# Patient Record
Sex: FEMALE | Race: ASIAN | NOT HISPANIC OR LATINO | Employment: FULL TIME | URBAN - METROPOLITAN AREA
[De-identification: names, ages, dates, MRNs, and addresses within clinical notes are randomized per-mention and may not be internally consistent; named-entity substitution may affect disease eponyms.]

---

## 2021-07-19 ENCOUNTER — APPOINTMENT (OUTPATIENT)
Dept: CT IMAGING | Facility: CLINIC | Age: 36
End: 2021-07-19
Attending: PHYSICIAN ASSISTANT
Payer: COMMERCIAL

## 2021-07-19 ENCOUNTER — HOSPITAL ENCOUNTER (EMERGENCY)
Facility: CLINIC | Age: 36
Discharge: HOME | End: 2021-07-20
Attending: PHYSICIAN ASSISTANT | Admitting: PHYSICIAN ASSISTANT
Payer: COMMERCIAL

## 2021-07-19 VITALS
RESPIRATION RATE: 15 BRPM | WEIGHT: 127 LBS | OXYGEN SATURATION: 98 % | TEMPERATURE: 98.9 F | HEART RATE: 86 BPM | DIASTOLIC BLOOD PRESSURE: 85 MMHG | SYSTOLIC BLOOD PRESSURE: 124 MMHG

## 2021-07-19 DIAGNOSIS — L03.312 CELLULITIS OF BACK EXCEPT BUTTOCK: ICD-10-CM

## 2021-07-19 DIAGNOSIS — E87.6 HYPOKALEMIA: ICD-10-CM

## 2021-07-19 DIAGNOSIS — S32.010A COMPRESSION FRACTURE OF L1 VERTEBRA, INITIAL ENCOUNTER (H): ICD-10-CM

## 2021-07-19 DIAGNOSIS — R50.9 FEVER AND CHILLS: ICD-10-CM

## 2021-07-19 LAB
ANION GAP SERPL CALCULATED.3IONS-SCNC: 6 MMOL/L (ref 3–14)
BUN SERPL-MCNC: 15 MG/DL (ref 7–30)
CALCIUM SERPL-MCNC: 8.7 MG/DL (ref 8.5–10.1)
CHLORIDE BLD-SCNC: 106 MMOL/L (ref 94–109)
CO2 SERPL-SCNC: 27 MMOL/L (ref 20–32)
CREAT SERPL-MCNC: 0.7 MG/DL (ref 0.52–1.04)
ERYTHROCYTE [DISTWIDTH] IN BLOOD BY AUTOMATED COUNT: 12.5 % (ref 10–15)
GFR SERPL CREATININE-BSD FRML MDRD: >90 ML/MIN/1.73M2
GLUCOSE BLD-MCNC: 90 MG/DL (ref 70–99)
HCT VFR BLD AUTO: 38.8 % (ref 35–47)
HGB BLD-MCNC: 12.3 G/DL (ref 11.7–15.7)
LACTATE SERPL-SCNC: 0.6 MMOL/L (ref 0.7–2)
MCH RBC QN AUTO: 30 PG (ref 26.5–33)
MCHC RBC AUTO-ENTMCNC: 31.7 G/DL (ref 31.5–36.5)
MCV RBC AUTO: 95 FL (ref 78–100)
PLATELET # BLD AUTO: 243 10E3/UL (ref 150–450)
POTASSIUM BLD-SCNC: 3.1 MMOL/L (ref 3.4–5.3)
RBC # BLD AUTO: 4.1 10E6/UL (ref 3.8–5.2)
SODIUM SERPL-SCNC: 139 MMOL/L (ref 133–144)
WBC # BLD AUTO: 17.8 10E3/UL (ref 4–11)

## 2021-07-19 PROCEDURE — 83605 ASSAY OF LACTIC ACID: CPT | Performed by: NURSE PRACTITIONER

## 2021-07-19 PROCEDURE — 81001 URINALYSIS AUTO W/SCOPE: CPT | Performed by: PHYSICIAN ASSISTANT

## 2021-07-19 PROCEDURE — 80048 BASIC METABOLIC PNL TOTAL CA: CPT | Performed by: NURSE PRACTITIONER

## 2021-07-19 PROCEDURE — 74177 CT ABD & PELVIS W/CONTRAST: CPT

## 2021-07-19 PROCEDURE — 99285 EMERGENCY DEPT VISIT HI MDM: CPT | Mod: 25

## 2021-07-19 PROCEDURE — 36592 COLLECT BLOOD FROM PICC: CPT | Performed by: NURSE PRACTITIONER

## 2021-07-19 PROCEDURE — 96361 HYDRATE IV INFUSION ADD-ON: CPT

## 2021-07-19 PROCEDURE — 250N000011 HC RX IP 250 OP 636: Performed by: PHYSICIAN ASSISTANT

## 2021-07-19 PROCEDURE — 96375 TX/PRO/DX INJ NEW DRUG ADDON: CPT

## 2021-07-19 PROCEDURE — 96374 THER/PROPH/DIAG INJ IV PUSH: CPT | Mod: 59

## 2021-07-19 PROCEDURE — 85027 COMPLETE CBC AUTOMATED: CPT | Performed by: NURSE PRACTITIONER

## 2021-07-19 PROCEDURE — 36415 COLL VENOUS BLD VENIPUNCTURE: CPT | Performed by: NURSE PRACTITIONER

## 2021-07-19 PROCEDURE — 258N000003 HC RX IP 258 OP 636: Performed by: PHYSICIAN ASSISTANT

## 2021-07-19 RX ORDER — ONDANSETRON 2 MG/ML
4 INJECTION INTRAMUSCULAR; INTRAVENOUS ONCE
Status: COMPLETED | OUTPATIENT
Start: 2021-07-19 | End: 2021-07-19

## 2021-07-19 RX ORDER — IOPAMIDOL 755 MG/ML
500 INJECTION, SOLUTION INTRAVASCULAR ONCE
Status: COMPLETED | OUTPATIENT
Start: 2021-07-19 | End: 2021-07-19

## 2021-07-19 RX ORDER — CEFTRIAXONE 1 G/1
1 INJECTION, POWDER, FOR SOLUTION INTRAMUSCULAR; INTRAVENOUS ONCE
Status: COMPLETED | OUTPATIENT
Start: 2021-07-19 | End: 2021-07-19

## 2021-07-19 RX ORDER — POTASSIUM CHLORIDE 1500 MG/1
40 TABLET, EXTENDED RELEASE ORAL ONCE
Status: COMPLETED | OUTPATIENT
Start: 2021-07-19 | End: 2021-07-20

## 2021-07-19 RX ADMIN — SODIUM CHLORIDE 56 ML: 9 INJECTION, SOLUTION INTRAVENOUS at 23:28

## 2021-07-19 RX ADMIN — CEFTRIAXONE 1 G: 1 INJECTION, POWDER, FOR SOLUTION INTRAMUSCULAR; INTRAVENOUS at 23:02

## 2021-07-19 RX ADMIN — ONDANSETRON 4 MG: 2 INJECTION INTRAMUSCULAR; INTRAVENOUS at 23:02

## 2021-07-19 RX ADMIN — IOPAMIDOL 64 ML: 755 INJECTION, SOLUTION INTRAVENOUS at 23:28

## 2021-07-19 NOTE — LETTER
July 20, 2021      To Whom It May Concern:      Francisco Parham was seen in our Emergency Department today, 07/20/21.  I expect her condition to improve over the next 1-2 days.  She may return to work/school when improved.    Sincerely,    Jaja GRIMALDO

## 2021-07-20 LAB
ALBUMIN UR-MCNC: 20 MG/DL
APPEARANCE UR: CLEAR
BILIRUB UR QL STRIP: NEGATIVE
COLOR UR AUTO: ABNORMAL
GLUCOSE UR STRIP-MCNC: NEGATIVE MG/DL
HGB UR QL STRIP: NEGATIVE
HOLD SPECIMEN: NORMAL
KETONES UR STRIP-MCNC: 20 MG/DL
LEUKOCYTE ESTERASE UR QL STRIP: NEGATIVE
MUCOUS THREADS #/AREA URNS LPF: PRESENT /LPF
NITRATE UR QL: NEGATIVE
PH UR STRIP: 6 [PH] (ref 5–7)
RBC URINE: 1 /HPF
SP GR UR STRIP: 1.03 (ref 1–1.03)
SQUAMOUS EPITHELIAL: 1 /HPF
UROBILINOGEN UR STRIP-MCNC: 2 MG/DL
WBC URINE: 1 /HPF

## 2021-07-20 PROCEDURE — 250N000013 HC RX MED GY IP 250 OP 250 PS 637: Performed by: PHYSICIAN ASSISTANT

## 2021-07-20 RX ORDER — DOXYCYCLINE 100 MG/1
100 CAPSULE ORAL 2 TIMES DAILY
Qty: 20 CAPSULE | Refills: 0 | Status: SHIPPED | OUTPATIENT
Start: 2021-07-20 | End: 2021-07-30

## 2021-07-20 RX ORDER — AMOXICILLIN 500 MG/1
500 CAPSULE ORAL 3 TIMES DAILY
Qty: 30 CAPSULE | Refills: 0 | Status: SHIPPED | OUTPATIENT
Start: 2021-07-20 | End: 2021-07-30

## 2021-07-20 RX ADMIN — POTASSIUM CHLORIDE 40 MEQ: 1500 TABLET, EXTENDED RELEASE ORAL at 00:16

## 2021-07-20 NOTE — ED PROVIDER NOTES
History   Chief Complaint:  Wound Check       HPI   Francisco Parham is a 36 year old female with history of bipolar disorder, migraines, and anxiety who presents for a wound check. Per chart review, the patient was seen at urgent care earlier today for suspected bug bite on her lower back from a week ago. Today, she says that the area is now more red and last night she developed chills, nausea, and shortness of breath. Urgent Care results below. Here in the ED, the patient says that the fever, chills and body aches developed 2 days ago. She says that the bug bite was 4-5 days ago on her left lower back and since then has remained painful. She says that the redness has spread since then. The patient endorses some diarrhea 3 days ago and some current abdominal pain. She endorses the use of Tylenol, with the latest dose being at 2000 today. The patient says that she is fully vaccinated against COVID and is pending COVID test results from urgent care.       Laboratory work-up from Urgent Care 7/19/21  CRP: 2.7 (H)  HCG Urine: negative   Lyme Disease Screen: 0.27, negative   SED rate: 21 (H)  Group A strep: negative   CBC: WBC 15.8 (H), HGB 12.3,     COVID test: pending     Review of Systems  Ten review of systems negative, apart from what is noted above in HPI.     Allergies:  Sumatriptan     Medications:  Patient denies any medication use.     Past Medical History:    Vertigo  EBEN III  Bipolar disorder    PTSD    Alcoholism   Migraines  Anxiety   Arthritis     Past Surgical History:    Breast implants     Family History:    Mental disorder  High cholesterol  Stroke  Heart attack  Migraines     Social History:  Patient presents to the ED alone.     Physical Exam     Patient Vitals for the past 24 hrs:   BP Temp Temp src Pulse Resp SpO2 Weight   07/19/21 2307 124/85 -- -- 86 15 98 % --   07/19/21 2245 -- -- -- -- -- -- 57.6 kg (127 lb)   07/19/21 2102 120/86 98.9  F (37.2  C) Oral 89 16 100 % --       Physical  Exam  General: Alert and interactive. Appears well. Cooperative and pleasant.   Eyes: The pupils are equal and round. EOMs intact. No scleral icterus.  ENT: No abnormalities to the external nose or ears. Mucous membranes moist. Posterior oropharynx is mildly erythematous. Neck: Trachea is in the midline. No nuchal rigidity.     CV: Regular rate and rhythm. S1 and S2 normal without murmur, click, gallop or rub.   Resp: Breath sounds are clear bilaterally, without rhonchi, wheezes, rales. Non-labored, no retractions or accessory muscle use.     GI: Abdomen is soft without distension. Tenderness to the lower abdomen without guarding. There is a large indurated area of swelling to the left flank area with a puncture wound along the medial aspect. There is about 10 cm of swelling with blanchable erythema and warmth but no fluctuance or signs of abscess.   MS: Moving all extremities well. Good muscle tone.   Neuro: Alert and oriented x 3. No focal neurologic deficits. Good strength and sensation in upper and lower extremities. Psych: Awake. Alert.  Normal affect. Appropriate interactions.  Lymph: No anterior or posterior cervical lymphadenopathy noted.    Emergency Department Course     Imaging:  CT Abdomen Pelvis w Contrast   Final Result   IMPRESSION:    1.  Skin thickening and soft tissue edema left flank consistent with the history of cellulitis. No abscess or intra-abdominal extension.   2.  Small amount of pelvic free fluid.   3.  Moderate compression deformity L1.          Laboratory:       Labs Ordered and Resulted from Time of ED Arrival Up to the Time of Departure from the ED   CBC WITH PLATELETS - Abnormal; Notable for the following components:       Result Value    WBC Count 17.8 (*)     All other components within normal limits   LACTIC ACID WHOLE BLOOD - Abnormal; Notable for the following components:    Lactic Acid 0.6 (*)     All other components within normal limits   BASIC METABOLIC PANEL - Abnormal;  Notable for the following components:    Potassium 3.1 (*)     All other components within normal limits   ROUTINE UA WITH MICROSCOPIC REFLEX TO CULTURE - Abnormal; Notable for the following components:    Color Urine Orange (*)     Ketones Urine 20  (*)     Protein Albumin Urine 20  (*)     Mucus Urine Present (*)     All other components within normal limits    Narrative:     Urine Culture not indicated   EXTRA RED TOP TUBE   EXTRA TUBE    Narrative:     The following orders were created for panel order Extra Tube ([facility] Draw).  Procedure                               Abnormality         Status                     ---------                               -----------         ------                     Extra Red Top Tube[[id number]]                               Final result                 Please view results for these tests on the individual orders.         Emergency Department Course:    Reviewed:  I reviewed nursing notes, vitals, past medical history and care everywhere     Assessments:  2233 I performed an exam of the patient as documented above.     Interventions:  Medications   cefTRIAXone (ROCEPHIN) 1 g vial to attach to  mL bag for ADULTS or NS 50 mL bag for PEDS (0 g Intravenous Stopped 7/19/21 2317)   ondansetron (ZOFRAN) injection 4 mg (4 mg Intravenous Given 7/19/21 2302)   0.9% sodium chloride BOLUS (0 mLs Intravenous Stopped 7/20/21 0017)   iopamidol (ISOVUE-370) solution 500 mL (64 mLs Intravenous Given 7/19/21 2328)   potassium chloride ER (KLOR-CON M) CR tablet 40 mEq (40 mEq Oral Given 7/20/21 0016)        Disposition:  The patient was discharged to home.       Impression & Plan     Medical Decision Making:  Francisco Parham is a 36 year old female who presents to the emergency department today for evaluation of fevers, chills, and abdominal pain with a rash/erythema to the left flank. The patient thinks that she got bit by a bug approximately 5 days ago and has had induration and swelling to  the left flank ever since then. She had laboratory work-up completed at an urgent care earlier that showed an elevated CRP, sed rate, white cell count. Her COVID swab is pending, but strep was negative. No significant cough, shortness of breath, or other upper respiratory symptoms that would suggest pneumonia. Given her abdominal discomfort and obvious cellulitis, did obtain CT scan to rule out abscess or other complicating intra-abdominal pathologies, but fortunately this is negative for abscess or intraabdominal extension. She does have subcutaneous swelling, consistent with cellulitis. She was given Rocephin here to initiate treatment in the area was marked. Her lactic is normal; no sepsis or other systemic illness. I will start her on doxycycline and amoxicillin to cover for MRSA, streptococcal skin infections, and Lyme disease. Her Lyme titer at the clinic earlier was negative, and this does not appear to be consistent with erythema migrans. I have suggested that she watch this area closely, following up with primary care in a few days for wound recheck. She should return here for fevers, rapidly spreading redness, persistent vomiting, or other worrisome concerns.    Diagnosis:    ICD-10-CM    1. Cellulitis of back except buttock  L03.312    2. Fever and chills  R50.9    3. Hypokalemia  E87.6    4. Compression fracture of L1 vertebra, initial encounter (H)  S32.010A        Discharge Medications:  Discharge Medication List as of 7/20/2021 12:17 AM      START taking these medications    Details   amoxicillin (AMOXIL) 500 MG capsule Take 1 capsule (500 mg) by mouth 3 times daily for 10 days, Disp-30 capsule, R-0, Local Print      doxycycline hyclate (VIBRAMYCIN) 100 MG capsule Take 1 capsule (100 mg) by mouth 2 times daily for 10 days, Disp-20 capsule, R-0, Local Print             Scribe Disclosure:  I, Chester Macedo, am serving as a scribe at 10:22 PM on 7/19/2021 to document services personally performed by  Lucy Way PA-C based on my observations and the provider's statements to me.          Lucy Way PA-C  07/20/21 0040

## 2021-07-20 NOTE — DISCHARGE INSTRUCTIONS
Begin Taking Amoxicillin and Doxycyline for strep and staph coverage (these cover Lyme disease as well).   Use heat to your flank area.   Watch the outlined area to make sure it is improving and not rapidly spreading.     Discharge Instructions  Cellulitis    Cellulitis is an infection of the skin that occurs when bacteria enter the skin.   Symptoms are generally redness, swelling, warmth and pain.  Your infection appeared to be appropriate to treat at home with antibiotics.  However, sometimes your infection may be worse than it seemed at first, or may worsen with time. If you have new or worse symptoms, you may need to be seen again in the Emergency Department or by your primary provider.    Generally, every Emergency Department visit should have a follow-up clinic visit with either a primary or a specialty clinic/provider. Please follow-up as instructed by your emergency provider today.    Return to the Emergency Department if:  The redness, pain, or swelling gets a lot worse.  If the red area was marked, return if it is red significantly beyond the marked area.  You are unable to get your antibiotics, or are vomiting (throwing up) these pills, or you cannot take them.  You are feeling more ill, weak or lightheaded.  You start to run a new fever (temperature >101 F).  Anything else about the infection worries or concerns you.  Treatment:  Start your antibiotics right away, and take them as prescribed. Be sure to finish the whole prescription, even if you are better.  Apply a heating pad, warm packs, or warm water soaks to the infected area for 15 minutes at a time, at least 3 times a day. Do not use a heating pad on your feet or legs if you have diabetes. Do not sleep with a heating pad on, since this can cause burns or skin injury.  Rest your injured area for at least 1-2 days. After that you may start using your extremity again as long as there is not too much pain.   Raise the injured area above the level of  your heart as much as possible in the first 1-2 days.  Tylenol  (acetaminophen), Motrin  (ibuprofen), or Advil  (ibuprofen) may help may help reduce pain and fever and may help you feel more comfortable. Be sure to read and follow the package directions, and ask your provider if you have questions.    If you were given a prescription for medicine here today, be sure to read all of the information (including the package insert) that comes with your prescription.  This will include important information about the medicine, its side effects, and any warnings that you need to know about.  The pharmacist who fills the prescription can provide more information and answer questions you may have about the medicine.  If you have questions or concerns that the pharmacist cannot address, please call or return to the Emergency Department.     Remember that you can always come back to the Emergency Department if you are not able to see your regular provider in the amount of time listed above, if you get any new symptoms, or if there is anything that worries you.

## 2021-07-20 NOTE — ED TRIAGE NOTES
Pt aox4, ABCs intact. Pt c/o fever/chills/body aches and increasing redness around bug bite. Pt was bit on Sunday by unknown insect. Redness around the bite is 5 inches in diameter. Tylenol last at 2000.

## 2021-10-17 ENCOUNTER — HEALTH MAINTENANCE LETTER (OUTPATIENT)
Age: 36
End: 2021-10-17

## 2022-10-03 ENCOUNTER — HEALTH MAINTENANCE LETTER (OUTPATIENT)
Age: 37
End: 2022-10-03

## 2023-02-11 ENCOUNTER — HEALTH MAINTENANCE LETTER (OUTPATIENT)
Age: 38
End: 2023-02-11

## 2024-03-09 ENCOUNTER — HEALTH MAINTENANCE LETTER (OUTPATIENT)
Age: 39
End: 2024-03-09

## 2024-03-16 ENCOUNTER — HOSPITAL ENCOUNTER (EMERGENCY)
Facility: CLINIC | Age: 39
Discharge: HOME | End: 2024-03-16
Attending: EMERGENCY MEDICINE | Admitting: EMERGENCY MEDICINE
Payer: COMMERCIAL

## 2024-03-16 ENCOUNTER — APPOINTMENT (OUTPATIENT)
Dept: CT IMAGING | Facility: CLINIC | Age: 39
End: 2024-03-16
Attending: EMERGENCY MEDICINE
Payer: COMMERCIAL

## 2024-03-16 VITALS
HEART RATE: 81 BPM | DIASTOLIC BLOOD PRESSURE: 110 MMHG | TEMPERATURE: 99.1 F | OXYGEN SATURATION: 100 % | SYSTOLIC BLOOD PRESSURE: 163 MMHG | RESPIRATION RATE: 16 BRPM

## 2024-03-16 DIAGNOSIS — S16.1XXA CERVICAL STRAIN, INITIAL ENCOUNTER: ICD-10-CM

## 2024-03-16 DIAGNOSIS — S06.0X0A CONCUSSION WITHOUT LOSS OF CONSCIOUSNESS, INITIAL ENCOUNTER: ICD-10-CM
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
## 2024-03-16 DIAGNOSIS — R03.0 ELEVATED BLOOD PRESSURE READING: ICD-10-CM

## 2024-03-16 LAB — HCG UR QL: NEGATIVE

## 2024-03-16 PROCEDURE — 81025 URINE PREGNANCY TEST: CPT | Performed by: EMERGENCY MEDICINE

## 2024-03-16 PROCEDURE — 99284 EMERGENCY DEPT VISIT MOD MDM: CPT | Mod: 25

## 2024-03-16 PROCEDURE — 72125 CT NECK SPINE W/O DYE: CPT

## 2024-03-16 RX ORDER — AMLODIPINE BESYLATE 10 MG/1
5 TABLET ORAL DAILY
Qty: 30 TABLET | Refills: 0 | Status: SHIPPED | OUTPATIENT
Start: 2024-03-16

## 2024-03-16 ASSESSMENT — ACTIVITIES OF DAILY LIVING (ADL)
ADLS_ACUITY_SCORE: 35
ADLS_ACUITY_SCORE: 33
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
## 2024-03-17 NOTE — DISCHARGE INSTRUCTIONS
Please resume taking your blood pressure medication, and keep a log to discuss with your primary care doctor.

## 2024-03-18 ENCOUNTER — HOSPITAL ENCOUNTER (EMERGENCY)
Facility: CLINIC | Age: 39
Discharge: HOME | End: 2024-03-18
Attending: EMERGENCY MEDICINE | Admitting: EMERGENCY MEDICINE
Payer: COMMERCIAL

## 2024-03-18 VITALS
HEIGHT: 64 IN | TEMPERATURE: 98.4 F | HEART RATE: 87 BPM | OXYGEN SATURATION: 98 % | RESPIRATION RATE: 18 BRPM | DIASTOLIC BLOOD PRESSURE: 83 MMHG | WEIGHT: 120 LBS | SYSTOLIC BLOOD PRESSURE: 128 MMHG | BODY MASS INDEX: 20.49 KG/M2

## 2024-03-18 DIAGNOSIS — R07.89 CHEST WALL PAIN: ICD-10-CM

## 2024-03-18 DIAGNOSIS — F41.9 ANXIETY: ICD-10-CM

## 2024-03-18 LAB
ALBUMIN SERPL BCG-MCNC: 4.3 G/DL (ref 3.5–5.2)
ALP SERPL-CCNC: 44 U/L (ref 40–150)
ALT SERPL W P-5'-P-CCNC: 16 U/L (ref 0–50)
ANION GAP SERPL CALCULATED.3IONS-SCNC: 13 MMOL/L (ref 7–15)
AST SERPL W P-5'-P-CCNC: 21 U/L (ref 0–45)
ATRIAL RATE - MUSE: 86 BPM
BASOPHILS # BLD AUTO: 0 10E3/UL (ref 0–0.2)
BASOPHILS NFR BLD AUTO: 0 %
BILIRUB SERPL-MCNC: 0.6 MG/DL
BUN SERPL-MCNC: 9.1 MG/DL (ref 6–20)
CALCIUM SERPL-MCNC: 8.8 MG/DL (ref 8.6–10)
CHLORIDE SERPL-SCNC: 105 MMOL/L (ref 98–107)
CREAT SERPL-MCNC: 0.58 MG/DL (ref 0.51–0.95)
DEPRECATED HCO3 PLAS-SCNC: 23 MMOL/L (ref 22–29)
DIASTOLIC BLOOD PRESSURE - MUSE: NORMAL MMHG
EGFRCR SERPLBLD CKD-EPI 2021: >90 ML/MIN/1.73M2
EOSINOPHIL # BLD AUTO: 0 10E3/UL (ref 0–0.7)
EOSINOPHIL NFR BLD AUTO: 0 %
ERYTHROCYTE [DISTWIDTH] IN BLOOD BY AUTOMATED COUNT: 12.2 % (ref 10–15)
GLUCOSE SERPL-MCNC: 183 MG/DL (ref 70–99)
HCT VFR BLD AUTO: 38.4 % (ref 35–47)
HGB BLD-MCNC: 12.5 G/DL (ref 11.7–15.7)
IMM GRANULOCYTES # BLD: 0 10E3/UL
IMM GRANULOCYTES NFR BLD: 0 %
INTERPRETATION ECG - MUSE: NORMAL
LYMPHOCYTES # BLD AUTO: 2.1 10E3/UL (ref 0.8–5.3)
LYMPHOCYTES NFR BLD AUTO: 26 %
MCH RBC QN AUTO: 30.3 PG (ref 26.5–33)
MCHC RBC AUTO-ENTMCNC: 32.6 G/DL (ref 31.5–36.5)
MCV RBC AUTO: 93 FL (ref 78–100)
MONOCYTES # BLD AUTO: 0.4 10E3/UL (ref 0–1.3)
MONOCYTES NFR BLD AUTO: 5 %
NEUTROPHILS # BLD AUTO: 5.4 10E3/UL (ref 1.6–8.3)
NEUTROPHILS NFR BLD AUTO: 69 %
NRBC # BLD AUTO: 0 10E3/UL
NRBC BLD AUTO-RTO: 0 /100
P AXIS - MUSE: 67 DEGREES
PLATELET # BLD AUTO: 272 10E3/UL (ref 150–450)
POTASSIUM SERPL-SCNC: 3.3 MMOL/L (ref 3.4–5.3)
PR INTERVAL - MUSE: 134 MS
PROT SERPL-MCNC: 7.3 G/DL (ref 6.4–8.3)
QRS DURATION - MUSE: 88 MS
QT - MUSE: 378 MS
QTC - MUSE: 452 MS
R AXIS - MUSE: -43 DEGREES
RBC # BLD AUTO: 4.13 10E6/UL (ref 3.8–5.2)
SODIUM SERPL-SCNC: 141 MMOL/L (ref 135–145)
SYSTOLIC BLOOD PRESSURE - MUSE: NORMAL MMHG
T AXIS - MUSE: 60 DEGREES
TROPONIN T SERPL HS-MCNC: <6 NG/L
VENTRICULAR RATE- MUSE: 86 BPM
WBC # BLD AUTO: 8 10E3/UL (ref 4–11)

## 2024-03-18 PROCEDURE — 84484 ASSAY OF TROPONIN QUANT: CPT | Performed by: EMERGENCY MEDICINE

## 2024-03-18 PROCEDURE — 250N000013 HC RX MED GY IP 250 OP 250 PS 637: Performed by: EMERGENCY MEDICINE

## 2024-03-18 PROCEDURE — 80053 COMPREHEN METABOLIC PANEL: CPT | Performed by: EMERGENCY MEDICINE

## 2024-03-18 PROCEDURE — 93005 ELECTROCARDIOGRAM TRACING: CPT

## 2024-03-18 PROCEDURE — 85025 COMPLETE CBC W/AUTO DIFF WBC: CPT | Performed by: EMERGENCY MEDICINE

## 2024-03-18 PROCEDURE — 36415 COLL VENOUS BLD VENIPUNCTURE: CPT | Performed by: EMERGENCY MEDICINE

## 2024-03-18 PROCEDURE — 99284 EMERGENCY DEPT VISIT MOD MDM: CPT

## 2024-03-18 RX ORDER — IBUPROFEN 400 MG/1
400 TABLET, FILM COATED ORAL ONCE
Status: COMPLETED | OUTPATIENT
Start: 2024-03-18 | End: 2024-03-18

## 2024-03-18 RX ORDER — DIAZEPAM 5 MG
5 TABLET ORAL ONCE
Status: COMPLETED | OUTPATIENT
Start: 2024-03-18 | End: 2024-03-18

## 2024-03-18 RX ORDER — ESCITALOPRAM OXALATE 10 MG/1
TABLET ORAL
COMMUNITY
Start: 2023-05-29

## 2024-03-18 RX ORDER — DIAZEPAM 5 MG
5 TABLET ORAL EVERY 6 HOURS PRN
Qty: 20 TABLET | Refills: 0 | Status: SHIPPED | OUTPATIENT
Start: 2024-03-18 | End: 2024-03-25

## 2024-03-18 RX ORDER — ESCITALOPRAM OXALATE 5 MG/1
5 TABLET ORAL DAILY
COMMUNITY
Start: 2023-09-29 | End: 2024-09-10

## 2024-03-18 RX ADMIN — IBUPROFEN 400 MG: 400 TABLET ORAL at 19:52

## 2024-03-18 RX ADMIN — DIAZEPAM 5 MG: 5 TABLET ORAL at 19:52

## 2024-03-18 ASSESSMENT — COLUMBIA-SUICIDE SEVERITY RATING SCALE - C-SSRS
2. HAVE YOU ACTUALLY HAD ANY THOUGHTS OF KILLING YOURSELF IN THE PAST MONTH?: NO
6. HAVE YOU EVER DONE ANYTHING, STARTED TO DO ANYTHING, OR PREPARED TO DO ANYTHING TO END YOUR LIFE?: NO
1. IN THE PAST MONTH, HAVE YOU WISHED YOU WERE DEAD OR WISHED YOU COULD GO TO SLEEP AND NOT WAKE UP?: NO

## 2024-03-18 ASSESSMENT — ACTIVITIES OF DAILY LIVING (ADL)
ADLS_ACUITY_SCORE: 35
ADLS_ACUITY_SCORE: 35
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
## 2024-04-09 ENCOUNTER — OFFICE VISIT (OUTPATIENT)
Dept: PHYSICAL MEDICINE AND REHAB | Facility: CLINIC | Age: 39
End: 2024-04-09
Attending: EMERGENCY MEDICINE
Payer: OTHER MISCELLANEOUS

## 2024-04-09 VITALS — HEART RATE: 79 BPM | DIASTOLIC BLOOD PRESSURE: 87 MMHG | SYSTOLIC BLOOD PRESSURE: 134 MMHG

## 2024-04-09 DIAGNOSIS — M54.2 NECK PAIN: ICD-10-CM
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
## 2024-04-09 DIAGNOSIS — S06.0X0A CONCUSSION WITHOUT LOSS OF CONSCIOUSNESS, INITIAL ENCOUNTER: Primary | ICD-10-CM

## 2024-04-09 PROCEDURE — 99205 OFFICE O/P NEW HI 60 MIN: CPT | Performed by: PHYSICAL MEDICINE & REHABILITATION

## 2024-04-09 RX ORDER — DIAZEPAM 5 MG
5 TABLET ORAL EVERY 6 HOURS PRN
COMMUNITY

## 2024-04-09 RX ORDER — HYDROXYZINE HYDROCHLORIDE 10 MG/1
10 TABLET, FILM COATED ORAL 3 TIMES DAILY PRN
COMMUNITY
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
## 2024-04-18 ENCOUNTER — THERAPY VISIT (OUTPATIENT)
Dept: OCCUPATIONAL THERAPY | Facility: CLINIC | Age: 39
End: 2024-04-18
Attending: PHYSICAL MEDICINE & REHABILITATION
Payer: COMMERCIAL

## 2024-04-18 DIAGNOSIS — S06.0X0A CONCUSSION WITHOUT LOSS OF CONSCIOUSNESS, INITIAL ENCOUNTER: ICD-10-CM

## 2024-04-18 PROCEDURE — 97535 SELF CARE MNGMENT TRAINING: CPT | Mod: GO | Performed by: OCCUPATIONAL THERAPIST

## 2024-04-18 PROCEDURE — 97165 OT EVAL LOW COMPLEX 30 MIN: CPT | Mod: GO | Performed by: OCCUPATIONAL THERAPIST

## 2024-04-18 PROCEDURE — 97530 THERAPEUTIC ACTIVITIES: CPT | Mod: GO | Performed by: OCCUPATIONAL THERAPIST
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
## 2024-04-25 ENCOUNTER — ANCILLARY PROCEDURE (OUTPATIENT)
Dept: CT IMAGING | Facility: CLINIC | Age: 39
End: 2024-04-25
Attending: PHYSICAL MEDICINE & REHABILITATION
Payer: OTHER MISCELLANEOUS

## 2024-04-25 DIAGNOSIS — S06.0X0A CONCUSSION WITHOUT LOSS OF CONSCIOUSNESS, INITIAL ENCOUNTER: ICD-10-CM

## 2024-04-25 PROCEDURE — 70450 CT HEAD/BRAIN W/O DYE: CPT | Performed by: RADIOLOGY

## 2024-05-14 ENCOUNTER — THERAPY VISIT (OUTPATIENT)
Dept: OCCUPATIONAL THERAPY | Facility: CLINIC | Age: 39
End: 2024-05-14
Attending: PHYSICAL MEDICINE & REHABILITATION
Payer: OTHER MISCELLANEOUS

## 2024-05-14 DIAGNOSIS — S06.0X0A CONCUSSION WITHOUT LOSS OF CONSCIOUSNESS, INITIAL ENCOUNTER: Primary | ICD-10-CM

## 2024-05-14 PROCEDURE — 97535 SELF CARE MNGMENT TRAINING: CPT | Mod: GO | Performed by: OCCUPATIONAL THERAPIST

## 2024-05-14 PROCEDURE — 97530 THERAPEUTIC ACTIVITIES: CPT | Mod: GO | Performed by: OCCUPATIONAL THERAPIST

## 2024-05-20 ENCOUNTER — TELEPHONE (OUTPATIENT)
Dept: BEHAVIORAL HEALTH | Facility: CLINIC | Age: 39
End: 2024-05-20
Payer: COMMERCIAL

## 2024-05-20 ENCOUNTER — OFFICE VISIT (OUTPATIENT)
Dept: NEUROLOGY | Facility: CLINIC | Age: 39
End: 2024-05-20
Payer: OTHER MISCELLANEOUS

## 2024-05-20 DIAGNOSIS — S06.0X0A CONCUSSION WITHOUT LOSS OF CONSCIOUSNESS, INITIAL ENCOUNTER: ICD-10-CM

## 2024-05-20 DIAGNOSIS — G47.00 INSOMNIA, UNSPECIFIED TYPE: ICD-10-CM

## 2024-05-20 DIAGNOSIS — F43.23 ADJUSTMENT DISORDER WITH MIXED ANXIETY AND DEPRESSED MOOD: Primary | ICD-10-CM

## 2024-05-20 PROCEDURE — 96132 NRPSYC TST EVAL PHYS/QHP 1ST: CPT | Performed by: CLINICAL NEUROPSYCHOLOGIST

## 2024-05-20 PROCEDURE — 96138 PSYCL/NRPSYC TECH 1ST: CPT | Performed by: CLINICAL NEUROPSYCHOLOGIST

## 2024-05-20 PROCEDURE — 96133 NRPSYC TST EVAL PHYS/QHP EA: CPT | Performed by: CLINICAL NEUROPSYCHOLOGIST

## 2024-05-20 PROCEDURE — 96116 NUBHVL XM PHYS/QHP 1ST HR: CPT | Performed by: CLINICAL NEUROPSYCHOLOGIST
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
## 2024-05-20 NOTE — TELEPHONE ENCOUNTER
First attempt at reaching patient. Left message asking for a return call to schedule with the TC. MyChart message sent. Will postpone for follow up tomorrow.    Nila Díaz  Transition Clinic Coordinator  05/20/24 5:27 PM

## 2024-05-20 NOTE — PROGRESS NOTES
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 evaluation of mild neurocognitive disorder.
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
## 2024-05-20 NOTE — PROGRESS NOTES
The patient was seen for a neuropsychological evaluation for the purposes of diagnostic clarification and treatment planning. 35 minutes of face-to-face testing were provided by this writer. An additional 5 minutes were spent scoring and compiling test results. The patient was cooperative with testing. No concerns were brought to my attention. Please see Dr. Robles's report for a detailed description of the charges and interpretation and integration of the findings.

## 2024-05-21 ENCOUNTER — TELEPHONE (OUTPATIENT)
Dept: BEHAVIORAL HEALTH | Facility: CLINIC | Age: 39
End: 2024-05-21
Payer: COMMERCIAL
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
## 2024-05-21 NOTE — TELEPHONE ENCOUNTER
Reached patient. Coordinator explained TC services and reviewed next LOC. Scheduled initial TC Therapy 6/4/2024. Patient reports appt is related to a Work Comp Claim.    Referral will be closed, reply sent to referral source and tracker completed.

## 2024-05-22 NOTE — PATIENT INSTRUCTIONS
Cognitive Strategies  Take notes! Keep a small notepad with you in your purse/pocket/bag and write things down that you want to remember. You might also keep a notepad in a convenient location in your home (e.g., next to your telephone or calendar). Taking your notes in a note pad (instead of on multiple post-it notes) might help you stay organized, and you will also remember where to go to look for the notes that you took.  Create checklists, grocery lists, and to-do lists. Cross things off as you finish them.  Use a calendar or planner and get in the habit of checking it daily. Make it a part of your morning and/or nighttime routine to remind yourself of upcoming events, activities, or appointments. Cross the days off as they pass so that you can quickly glance at the calendar to know what day it is and what you have going on.  Set alarm reminders. For example, you can set an alarm to remind you to take your medications, turn off the oven, turn off the sprinkler outside, or to start getting ready for your appointment. If you use a smart phone, often times you can label the alarm that goes off so that you know what the alarm is for when it chimes.  Use a pillbox to follow your medication regimen. A pillbox acts as a nice visual cue to remind us to take our medications. If you have trouble remembering whether or not you have already taken your medications today, a pillbox can be extremely helpful to help with this issue.  Use a GPS when driving to help you stay on route.  When having an important conversation or completing an important task, reduce as many distractions in the environment as you can. For example, turn off the TV or the music in the background. Turn off or silence your cell phone. Clear your work area of everything that you do not need for the task at hand.  Establish set locations for certain items. For example, if you keep your keys on a hook by your door, you will always know where to go to look  for them.   Maintain a daily routine, especially for morning and nighttime tasks.    Sleep Hygiene    What is Sleep Hygiene?  'Sleep hygiene' is the term used to describe good sleep habits. Considerable research has gone into developing a set of guidelines and tips which are designed to enhance good sleeping, and there is much evidence to suggest that these strategies can provide long-term solutions to sleep difficulties.     Sleep Hygiene Tips:  1) Get regular. One of the best ways to train your body to sleep well is to go to bed and get up at more or less the same time every day, even on weekends and days off! This regular rhythm will make you feel better and will give your body something to work from.    2) Sleep when sleepy. Only try to sleep when you actually feel tired or sleepy, rather than spending too much time awake in bed.    3) Get up & try again. If you haven't been able to get to sleep after about 20 minutes or more, get up and do something calming or boring until you feel sleepy, then return to bed and try again. Sit quietly on the couch with the lights off (bright light will tell your brain that it is time to wake up), or read something boring like the phone book. Avoid doing anything that is too stimulating or interesting, as this will wake you up even more.    4) Avoid caffeine & nicotine. It is best to avoid consuming any caffeine (in coffee, tea, cola drinks, chocolate, and some medications) or nicotine (cigarettes) for at least 4-6 hours before going to bed. These substances act as stimulants and interfere with the ability to fall asleep.    5) Avoid alcohol. It is also best to avoid alcohol for at least 4-6 hours before going to bed. Many people believe that alcohol is relaxing and helps them to get to sleep at first, but it actually interrupts the quality of sleep.    6) Bed is for sleeping. Try not to use your bed for anything other than sleeping and sex, so that your body comes to associate  bed with sleep. If you use bed as a place to watch TV, eat, read, work on your laptop, pay bills, and other things, your body will not learn this connection.    7) No naps. It is best to avoid taking naps during the day, to make sure that you are tired at bedtime. If you can't make it through the day without a nap, make sure it is for less than an hour and before 3pm.    8) Sleep rituals. You can develop your own rituals of things to remind your body that it is time to sleep - some people find it useful to do relaxing stretches or breathing exercises for 15 minutes before bed each night, or sit calmly with a cup of caffeine-free tea.    9) Bath time. Having a hot bath 1-2 hours before bedtime can be useful, as it will raise your body temperature, causing you to feel sleepy as your body temperature drops again. Research shows that sleepiness is associated with a drop in body temperature.    10) No clock-watching. Many people who struggle with sleep tend to watch the clock too much. Frequently checking the clock during the night can wake you up (especially if you turn on the light to read the time) and reinforces negative thoughts such as  Oh no, look how late it is, I'll never get to sleep  or  it's so early, I have only slept for 5 hours, this is terrible.     11) Use a sleep diary. This worksheet can be a useful way of making sure you have the right facts about your sleep, rather than making assumptions. Because a diary involves watching the clock (see point 10) it is a good idea to only use it for two weeks to get an idea of what is going and then perhaps two months down the track to see how you are progressing.    12) Exercise. Regular exercise is a good idea to help with good sleep, but try not to do strenuous exercise in the 4 hours before bedtime. Morning walks are a great way to start the day feeling refreshed!    13) Eat right. A healthy, balanced diet will help you to sleep well, but timing is important.  Some people find that a very empty stomach at bedtime is distracting, so it can be useful to have a light snack, but a heavy meal soon before bed can also interrupt sleep. Some people recommend a warm glass of milk, which contains tryptophan, which acts as a natural sleep inducer.    14) The right space. It is very important that your bed and bedroom are quiet and comfortable for sleeping. A cooler room with enough blankets to stay warm is best, and make sure you have curtains or an eye mask to block out early morning light and earplugs if there is noise outside your room.    15) Keep daytime routine the same. Even if you have a bad night sleep and are tired it is important that you try to keep your daytime activities the same as you had planned. That is, don't avoid activities because you feel tired. This can reinforce the insomnia.    16) Take time to plan during the day. Do your thoughts keep you up all night? It is common for people to plan out the next day or run through their mental to-do list when they lay down to sleep. Instead of keeping your mind active in this way at night, take time to plan out the next day or week in the morning or afternoon and write down your plan/to-do list in a notebook or calendar. That way, when your thoughts drift to planning at night, you can put your mind at ease more quickly by reminding yourself that everything is already planned out.      Relaxation Techniques    Search on YouTube and follow along with the videos:  Diaphragmatic (deep) Breathing  Progressive Muscle Relaxation  Guided Imagery/Visualization  Meditation  Mindfulness activities    Relaxation Apps (5-10 minute guided relaxation audios/videos):  Calm  Head Space  Virtual Hope Box    Other Relaxing Activities:  Get some fresh air  Exercise/go for a walk  Take a warm bath  Adult coloring books  Relaxing music  Other activities you enjoy...

## 2024-05-24 ENCOUNTER — TELEPHONE (OUTPATIENT)
Dept: BEHAVIORAL HEALTH | Facility: CLINIC | Age: 39
End: 2024-05-24
Payer: COMMERCIAL

## 2024-05-24 NOTE — TELEPHONE ENCOUNTER
Writer spoke with patient and rescheduled tc therapy new visit for 06/06/2024 @ 2:00 pm.    Dorothea Ang  05/24/2024  [id number]

## 2024-05-31 ENCOUNTER — THERAPY VISIT (OUTPATIENT)
Dept: OCCUPATIONAL THERAPY | Facility: CLINIC | Age: 39
End: 2024-05-31
Attending: PHYSICAL MEDICINE & REHABILITATION
Payer: OTHER MISCELLANEOUS

## 2024-05-31 DIAGNOSIS — S06.0X0A CONCUSSION WITHOUT LOSS OF CONSCIOUSNESS, INITIAL ENCOUNTER: Primary | ICD-10-CM

## 2024-05-31 PROCEDURE — 97535 SELF CARE MNGMENT TRAINING: CPT | Mod: GO | Performed by: OCCUPATIONAL THERAPIST

## 2024-06-06 ENCOUNTER — DOCUMENTATION ONLY (OUTPATIENT)
Dept: PHYSICAL MEDICINE AND REHAB | Facility: CLINIC | Age: 39
End: 2024-06-06
Payer: COMMERCIAL

## 2024-06-06 NOTE — PROGRESS NOTES
Office visit notes from 05/20/24 and referrals placed by Dr. Guzman have been scanned and faxed ([phone])

## 2024-06-24 ENCOUNTER — THERAPY VISIT (OUTPATIENT)
Dept: OCCUPATIONAL THERAPY | Facility: CLINIC | Age: 39
End: 2024-06-24
Attending: PHYSICAL MEDICINE & REHABILITATION
Payer: OTHER MISCELLANEOUS

## 2024-06-24 DIAGNOSIS — S06.0X0A CONCUSSION WITHOUT LOSS OF CONSCIOUSNESS, INITIAL ENCOUNTER: Primary | ICD-10-CM

## 2024-06-24 PROCEDURE — 97530 THERAPEUTIC ACTIVITIES: CPT | Mod: GO

## 2024-06-24 PROCEDURE — 97535 SELF CARE MNGMENT TRAINING: CPT | Mod: GO

## 2024-06-25 ENCOUNTER — TELEPHONE (OUTPATIENT)
Dept: BEHAVIORAL HEALTH | Facility: CLINIC | Age: 39
End: 2024-06-25
Payer: COMMERCIAL

## 2024-06-25 NOTE — TELEPHONE ENCOUNTER
Patient left  for TC stating call back about appointment.     Coordinator returned call to patient. Scheduled new TC therapy appointment 7/11/2024.    Pt previously scheduled with TC but cancelled. Stated this is related to work comp claim and  working with stated that the appointment should be covered.    Nila Díaz  Transition Clinic Coordinator  06/25/24 3:17 PM

## 2024-07-02 ENCOUNTER — OFFICE VISIT (OUTPATIENT)
Dept: PHYSICAL MEDICINE AND REHAB | Facility: CLINIC | Age: 39
End: 2024-07-02
Payer: OTHER MISCELLANEOUS

## 2024-07-02 VITALS — HEART RATE: 79 BPM | DIASTOLIC BLOOD PRESSURE: 83 MMHG | SYSTOLIC BLOOD PRESSURE: 123 MMHG

## 2024-07-02 DIAGNOSIS — S06.0XAD CONCUSSION WITH UNKNOWN LOSS OF CONSCIOUSNESS STATUS, SUBSEQUENT ENCOUNTER: Primary | ICD-10-CM

## 2024-07-02 PROCEDURE — 99214 OFFICE O/P EST MOD 30 MIN: CPT | Performed by: PHYSICAL MEDICINE & REHABILITATION

## 2024-07-02 RX ORDER — PROPRANOLOL HYDROCHLORIDE 10 MG/1
10 TABLET ORAL 2 TIMES DAILY
Qty: 60 TABLET | Refills: 2 | Status: SHIPPED | OUTPATIENT
Start: 2024-07-02 | End: 2024-09-30

## 2024-07-02 RX ORDER — CLINDAMYCIN HCL 150 MG
150 CAPSULE ORAL 3 TIMES DAILY
COMMUNITY
End: 2024-09-10
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
## 2024-07-02 NOTE — NURSING NOTE
Chief Complaint   Patient presents with    Follow Up     Return       Gail Menendez MA on 7/2/2024 at 4:02 PM

## 2024-07-03 ENCOUNTER — THERAPY VISIT (OUTPATIENT)
Dept: OCCUPATIONAL THERAPY | Facility: CLINIC | Age: 39
End: 2024-07-03
Attending: PHYSICAL MEDICINE & REHABILITATION
Payer: OTHER MISCELLANEOUS

## 2024-07-03 DIAGNOSIS — S06.0X0A CONCUSSION WITHOUT LOSS OF CONSCIOUSNESS, INITIAL ENCOUNTER: Primary | ICD-10-CM

## 2024-07-03 PROCEDURE — 97535 SELF CARE MNGMENT TRAINING: CPT | Mod: GO

## 2024-07-09 ENCOUNTER — THERAPY VISIT (OUTPATIENT)
Dept: OCCUPATIONAL THERAPY | Facility: CLINIC | Age: 39
End: 2024-07-09
Attending: PHYSICAL MEDICINE & REHABILITATION
Payer: OTHER MISCELLANEOUS

## 2024-07-09 DIAGNOSIS — S06.0X0A CONCUSSION WITHOUT LOSS OF CONSCIOUSNESS, INITIAL ENCOUNTER: Primary | ICD-10-CM

## 2024-07-09 PROCEDURE — 97530 THERAPEUTIC ACTIVITIES: CPT | Mod: GO | Performed by: OCCUPATIONAL THERAPIST

## 2024-07-09 PROCEDURE — 97535 SELF CARE MNGMENT TRAINING: CPT | Mod: GO | Performed by: OCCUPATIONAL THERAPIST

## 2024-07-11 ENCOUNTER — VIRTUAL VISIT (OUTPATIENT)
Dept: BEHAVIORAL HEALTH | Facility: CLINIC | Age: 39
End: 2024-07-11
Payer: OTHER MISCELLANEOUS

## 2024-07-11 DIAGNOSIS — F43.23 ADJUSTMENT DISORDER WITH MIXED ANXIETY AND DEPRESSED MOOD: Primary | ICD-10-CM

## 2024-07-11 PROCEDURE — 99207 PR NO BILLABLE SERVICE THIS VISIT: CPT | Mod: 95 | Performed by: SOCIAL WORKER

## 2024-07-11 ASSESSMENT — ANXIETY QUESTIONNAIRES
IF YOU CHECKED OFF ANY PROBLEMS ON THIS QUESTIONNAIRE, HOW DIFFICULT HAVE THESE PROBLEMS MADE IT FOR YOU TO DO YOUR WORK, TAKE CARE OF THINGS AT HOME, OR GET ALONG WITH OTHER PEOPLE: SOMEWHAT DIFFICULT
7. FEELING AFRAID AS IF SOMETHING AWFUL MIGHT HAPPEN: SEVERAL DAYS
3. WORRYING TOO MUCH ABOUT DIFFERENT THINGS: NEARLY EVERY DAY
GAD7 TOTAL SCORE: 15
6. BECOMING EASILY ANNOYED OR IRRITABLE: MORE THAN HALF THE DAYS
1. FEELING NERVOUS, ANXIOUS, OR ON EDGE: NEARLY EVERY DAY
2. NOT BEING ABLE TO STOP OR CONTROL WORRYING: MORE THAN HALF THE DAYS
4. TROUBLE RELAXING: MORE THAN HALF THE DAYS
5. BEING SO RESTLESS THAT IT IS HARD TO SIT STILL: MORE THAN HALF THE DAYS

## 2024-07-11 ASSESSMENT — PATIENT HEALTH QUESTIONNAIRE - PHQ9: SUM OF ALL RESPONSES TO PHQ QUESTIONS 1-9: 14
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
## 2024-07-15 ENCOUNTER — THERAPY VISIT (OUTPATIENT)
Dept: OCCUPATIONAL THERAPY | Facility: CLINIC | Age: 39
End: 2024-07-15
Attending: PHYSICAL MEDICINE & REHABILITATION
Payer: OTHER MISCELLANEOUS

## 2024-07-15 DIAGNOSIS — S06.0X0A CONCUSSION WITHOUT LOSS OF CONSCIOUSNESS, INITIAL ENCOUNTER: Primary | ICD-10-CM

## 2024-07-15 PROCEDURE — 97530 THERAPEUTIC ACTIVITIES: CPT | Mod: GO

## 2024-07-15 PROCEDURE — 97535 SELF CARE MNGMENT TRAINING: CPT | Mod: GO
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
## 2024-08-14 NOTE — TELEPHONE ENCOUNTER
REASON FOR VISIT: neck pain    DATE OF APPT: 9/10/2024   NOTES (FOR ALL VISITS) STATUS DETAILS   OFFICE NOTE from referring provider Internal [facility] Traci Cha MD   MEDICATION LIST Internal    IMAGING  (FOR ALL VISITS)     XR N/A    MRI (HEAD, NECK, SPINE) N/A    CT (HEAD, NECK, SPINE) Internal [facility]  CT Head 4/25/2024    [facility]  CT Cervical spine 3/16/2024
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
## 2024-09-10 ENCOUNTER — PRE VISIT (OUTPATIENT)
Dept: NEUROSURGERY | Facility: CLINIC | Age: 39
End: 2024-09-10
Payer: COMMERCIAL

## 2024-09-10 ENCOUNTER — OFFICE VISIT (OUTPATIENT)
Dept: NEUROSURGERY | Facility: CLINIC | Age: 39
End: 2024-09-10
Attending: PHYSICAL MEDICINE & REHABILITATION
Payer: OTHER MISCELLANEOUS

## 2024-09-10 VITALS
HEIGHT: 63 IN | DIASTOLIC BLOOD PRESSURE: 85 MMHG | BODY MASS INDEX: 20.2 KG/M2 | WEIGHT: 114 LBS | SYSTOLIC BLOOD PRESSURE: 125 MMHG | HEART RATE: 93 BPM

## 2024-09-10 DIAGNOSIS — M54.2 NECK PAIN: ICD-10-CM

## 2024-09-10 DIAGNOSIS — S06.0XAD CONCUSSION WITH UNKNOWN LOSS OF CONSCIOUSNESS STATUS, SUBSEQUENT ENCOUNTER: ICD-10-CM

## 2024-09-10 DIAGNOSIS — M79.18 MYOFASCIAL PAIN: Primary | ICD-10-CM

## 2024-09-10 DIAGNOSIS — G89.29 CHRONIC BILATERAL THORACIC BACK PAIN: ICD-10-CM

## 2024-09-10 DIAGNOSIS — M54.6 CHRONIC BILATERAL THORACIC BACK PAIN: ICD-10-CM

## 2024-09-10 DIAGNOSIS — R29.898 MUSCULAR DECONDITIONING: ICD-10-CM

## 2024-09-10 PROCEDURE — 99215 OFFICE O/P EST HI 40 MIN: CPT | Performed by: PREVENTIVE MEDICINE

## 2024-09-10 PROCEDURE — 99417 PROLNG OP E/M EACH 15 MIN: CPT | Performed by: PREVENTIVE MEDICINE

## 2024-09-10 ASSESSMENT — PAIN SCALES - GENERAL: PAINLEVEL: MODERATE PAIN (4)
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
## 2024-09-10 NOTE — NURSING NOTE
"Reason For Visit:   Chief Complaint   Patient presents with    Consult     Neck pain         Occupation:   Currently working? Yes.  Work status?  Full time.    Sports: n  Activities: n             /85   Pulse 93   Ht 1.6 m (5' 3\")   Wt 51.7 kg (114 lb)   BMI 20.19 kg/m        Allergies   Allergen Reactions    Sumatriptan Other (See Comments), Difficulty breathing, Dizziness, Headache and Shortness Of Breath       Current Outpatient Medications   Medication Sig Dispense Refill    amLODIPine (NORVASC) 10 MG tablet Take 0.5 tablets (5 mg) by mouth daily 30 tablet 0    diazepam (VALIUM) 5 MG tablet Take 5 mg by mouth every 6 hours as needed for anxiety.      escitalopram (LEXAPRO) 10 MG tablet       hydrOXYzine HCl (ATARAX) 10 MG tablet Take 10 mg by mouth 3 times daily as needed for itching      propranolol (INDERAL) 10 MG tablet Take 1 tablet (10 mg) by mouth 2 times daily for 90 days (Patient not taking: Reported on 9/10/2024) 60 tablet 2     No current facility-administered medications for this visit.         Darla Severin-Brown, LPN   "

## 2024-09-12 ENCOUNTER — THERAPY VISIT (OUTPATIENT)
Dept: OCCUPATIONAL THERAPY | Facility: CLINIC | Age: 39
End: 2024-09-12
Attending: PHYSICAL MEDICINE & REHABILITATION
Payer: OTHER MISCELLANEOUS

## 2024-09-12 DIAGNOSIS — S06.0X0A CONCUSSION WITHOUT LOSS OF CONSCIOUSNESS, INITIAL ENCOUNTER: Primary | ICD-10-CM

## 2024-09-12 PROCEDURE — 97530 THERAPEUTIC ACTIVITIES: CPT | Mod: GO

## 2024-09-24 ENCOUNTER — THERAPY VISIT (OUTPATIENT)
Dept: OCCUPATIONAL THERAPY | Facility: CLINIC | Age: 39
End: 2024-09-24
Attending: PHYSICAL MEDICINE & REHABILITATION

## 2024-09-24 DIAGNOSIS — S06.0X0A CONCUSSION WITHOUT LOSS OF CONSCIOUSNESS, INITIAL ENCOUNTER: Primary | ICD-10-CM

## 2024-09-24 PROCEDURE — 97530 THERAPEUTIC ACTIVITIES: CPT | Mod: GO | Performed by: OCCUPATIONAL THERAPIST

## 2024-09-24 PROCEDURE — 97535 SELF CARE MNGMENT TRAINING: CPT | Mod: GO | Performed by: OCCUPATIONAL THERAPIST

## 2024-10-11 ENCOUNTER — THERAPY VISIT (OUTPATIENT)
Dept: OCCUPATIONAL THERAPY | Facility: CLINIC | Age: 39
End: 2024-10-11
Attending: PHYSICAL MEDICINE & REHABILITATION
Payer: OTHER MISCELLANEOUS

## 2024-10-11 DIAGNOSIS — S06.0X0A CONCUSSION WITHOUT LOSS OF CONSCIOUSNESS, INITIAL ENCOUNTER: Primary | ICD-10-CM

## 2024-10-11 PROCEDURE — 97530 THERAPEUTIC ACTIVITIES: CPT | Mod: GO

## 2024-10-15 ENCOUNTER — PATIENT OUTREACH (OUTPATIENT)
Dept: CARE COORDINATION | Facility: CLINIC | Age: 39
End: 2024-10-15
Payer: COMMERCIAL

## 2024-10-17 ENCOUNTER — PATIENT OUTREACH (OUTPATIENT)
Dept: CARE COORDINATION | Facility: CLINIC | Age: 39
End: 2024-10-17
Payer: COMMERCIAL

## 2024-10-24 ENCOUNTER — OFFICE VISIT (OUTPATIENT)
Dept: OPTOMETRY | Facility: CLINIC | Age: 39
End: 2024-10-24
Payer: OTHER MISCELLANEOUS

## 2024-10-24 DIAGNOSIS — H53.143 PHOTOPHOBIA OF BOTH EYES: ICD-10-CM

## 2024-10-24 DIAGNOSIS — Z98.890 STATUS POST LASIK SURGERY: ICD-10-CM

## 2024-10-24 DIAGNOSIS — H52.13 MYOPIA OF BOTH EYES: ICD-10-CM

## 2024-10-24 DIAGNOSIS — Z87.820 HISTORY OF CLOSED HEAD INJURY: Primary | ICD-10-CM

## 2024-10-24 PROBLEM — R42 VERTIGO: Status: ACTIVE | Noted: 2020-07-21

## 2024-10-24 PROBLEM — L02.211 ABSCESS OF FLANK: Status: ACTIVE | Noted: 2021-07-22

## 2024-10-24 PROBLEM — D06.9 CIN III (CERVICAL INTRAEPITHELIAL NEOPLASIA III): Status: ACTIVE | Noted: 2019-10-14

## 2024-10-24 PROBLEM — F41.1 GAD (GENERALIZED ANXIETY DISORDER): Status: ACTIVE | Noted: 2022-10-31

## 2024-10-24 PROBLEM — I10 ESSENTIAL HYPERTENSION: Status: ACTIVE | Noted: 2022-10-31

## 2024-10-24 PROBLEM — M25.532 LEFT WRIST PAIN: Status: ACTIVE | Noted: 2021-01-11

## 2024-10-24 PROBLEM — G43.719 INTRACTABLE CHRONIC MIGRAINE WITHOUT AURA AND WITHOUT STATUS MIGRAINOSUS: Status: ACTIVE | Noted: 2020-07-21

## 2024-10-24 PROBLEM — E78.5 HYPERLIPIDEMIA: Status: ACTIVE | Noted: 2022-11-09

## 2024-10-24 ASSESSMENT — CONF VISUAL FIELD
OD_INFERIOR_TEMPORAL_RESTRICTION: 0
OD_SUPERIOR_NASAL_RESTRICTION: 0
OS_SUPERIOR_TEMPORAL_RESTRICTION: 0
OS_SUPERIOR_NASAL_RESTRICTION: 0
OS_NORMAL: 1
OS_INFERIOR_NASAL_RESTRICTION: 0
OD_INFERIOR_NASAL_RESTRICTION: 0
OS_INFERIOR_TEMPORAL_RESTRICTION: 0
OD_NORMAL: 1
METHOD: COUNTING FINGERS
OD_SUPERIOR_TEMPORAL_RESTRICTION: 0

## 2024-10-24 ASSESSMENT — EXTERNAL EXAM - LEFT EYE: OS_EXAM: NORMAL

## 2024-10-24 ASSESSMENT — REFRACTION_MANIFEST
OD_SPHERE: -1.00
OD_CYLINDER: +0.75
OS_CYLINDER: SPHERE
OD_AXIS: 092
OS_SPHERE: -0.50

## 2024-10-24 ASSESSMENT — SLIT LAMP EXAM - LIDS
COMMENTS: NORMAL
COMMENTS: NORMAL

## 2024-10-24 ASSESSMENT — TONOMETRY
OD_IOP_MMHG: 10
OS_IOP_MMHG: 11
IOP_METHOD: ICARE

## 2024-10-24 ASSESSMENT — EXTERNAL EXAM - RIGHT EYE: OD_EXAM: NORMAL

## 2024-10-24 ASSESSMENT — CUP TO DISC RATIO
OD_RATIO: 0.25
OS_RATIO: 0.25

## 2024-10-24 ASSESSMENT — VISUAL ACUITY
OD_SC: 20/25
OS_SC: 20/25
METHOD: SNELLEN - LINEAR

## 2024-10-24 NOTE — PROGRESS NOTES
Assessment/Plan  (Z87.820) History of closed head injury  (primary encounter diagnosis)  Comment: Injured at work March 2024  Plan: Discussed findings with patient. Reassured patient that ocular health and binocular vision look good today. Suspect that visual disturbances will improve with correction of refractive error or LASIK enhancement surgery (if she opts for that evaluation). Recommend that patient obtain glasses for distance vision to improve doubling/haloes/distortions in vision.     (H53.143) Photophobia of both eyes  Comment: Constant since injury. Normal ocular health exam today.   Plan: Blue light blocker recommended for patient's glasses to help with glare and photosensitivity. Gradual return to pre-injury lighting levels was encouraged.     (H52.13) Myopia of both eyes  Comment: Residual since LASIK ~8 years ago.   Plan: See above note.     (Z98.890) Status post LASIK surgery  Plan: See above note.           More than 30 minutes were spent on the date of the encounter doing chart review, history and exam, documentation, and further activities as noted above.    Complete documentation of historical and exam elements from today's encounter can  be found in the full encounter summary report (not reduplicated in this progress  note). I personally obtained the chief complaint(s) and history of present illness. I  confirmed and edited as necessary the review of systems, past medical/surgical  history, family history, social history, and examination findings as documented by  others; and I examined the patient myself. I personally reviewed the relevant tests,  images, and reports as documented above. I formulated and edited as necessary the  assessment and plan and discussed the findings and management plan with the  patient and family.    South Tran OD
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
## 2024-10-29 ENCOUNTER — OFFICE VISIT (OUTPATIENT)
Dept: PHYSICAL MEDICINE AND REHAB | Facility: CLINIC | Age: 39
End: 2024-10-29
Payer: OTHER MISCELLANEOUS

## 2024-10-29 VITALS — SYSTOLIC BLOOD PRESSURE: 91 MMHG | DIASTOLIC BLOOD PRESSURE: 57 MMHG | HEART RATE: 87 BPM

## 2024-10-29 DIAGNOSIS — S06.0XAD CONCUSSION WITH UNKNOWN LOSS OF CONSCIOUSNESS STATUS, SUBSEQUENT ENCOUNTER: Primary | ICD-10-CM

## 2024-10-29 PROCEDURE — 99214 OFFICE O/P EST MOD 30 MIN: CPT | Performed by: PHYSICAL MEDICINE & REHABILITATION
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
## 2024-10-29 NOTE — NURSING NOTE
Chief Complaint   Patient presents with    Follow Up     Return       Gail Menendez MA on 10/29/2024 at 3:54 PM
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
## 2024-11-01 ENCOUNTER — THERAPY VISIT (OUTPATIENT)
Dept: OCCUPATIONAL THERAPY | Facility: CLINIC | Age: 39
End: 2024-11-01
Attending: PHYSICAL MEDICINE & REHABILITATION
Payer: OTHER MISCELLANEOUS

## 2024-11-01 DIAGNOSIS — S06.0X0A CONCUSSION WITHOUT LOSS OF CONSCIOUSNESS, INITIAL ENCOUNTER: Primary | ICD-10-CM

## 2024-11-01 PROCEDURE — 97535 SELF CARE MNGMENT TRAINING: CPT | Mod: GO | Performed by: OCCUPATIONAL THERAPIST

## 2024-11-01 PROCEDURE — 97530 THERAPEUTIC ACTIVITIES: CPT | Mod: GO | Performed by: OCCUPATIONAL THERAPIST

## 2024-11-12 NOTE — PROGRESS NOTES
11/01/24 0500   Appointment Info   Treating Provider Kimberly Pedroza OTR/L   Total/Authorized Visits 11/12 -  [facility]EL[facility] - no  limits, no exclusions. 12 visits approved   Medical Diagnosis Concussion without loss of consciousness, initial encounter (S06.0X0A)  - Primary   OT Tx Diagnosis decreased ADL Butler   Progress Note/Certification   Onset of Illness/Injury or Date of Surgery 03/08/24   Therapy Frequency 1x/week, decreasing frequency prn   Predicted Duration 90 days   Progress Note Due Date 01/11/25   Progress Note Completed Date 07/15/24   Goals   OT Goals 1;2;3   OT Goal 1   Goal Identifier near vision/peripersonal visual scanning   Goal Description Patient will report and/or demonstrate ability to tolerate 60 minutes on the computer (with a recommended 20-30 sec.break every 20 minutes) with minimal to no eye strain for increased independence with various ADL tasks (communication with others, management of healthcare, etc.), using compensatory strategies as needed.   Rationale In order to maximize safety and independence with performance of self-care activities   Goal Progress Goal progressing. Administered oculomotor, accommodation, acuity, and convergence insufficiency symptom screens - see results below. Trained in near-vision compensatory strategies (see below) as well as visual scanning/visual skills HEP: pursuits (circles), saccades (Carlos scan pages, Eh string beads), and convergence (Eh string small jumps and large jump). Pt demonstrates improvements in visual scanning and visual focusing skills, as well as improvements in subjective reports of eye strain but continues to c/o difficulty with vision-based ADLs/IADLs and overall eye strain/fatigue. Will continue to progress in future sessions.   Target Date 10/13/24   OT Goal 2   Goal Identifier extrapersonal visual scanning   Goal Description Patient to complete extrapersonal visual scanning tasks with improved efficiency and accuracy by  demonstrating WNL on 4 modes of Dynavision and Scancourse with little to no increase in symptoms, using compensatory strategies prn, for increased independence with functional mobility.   Rationale In order to maximize safety and independence with performance of self-care activities   Goal Progress Goal progressing. Initiated administration of Dynavision as therapeutic tool with pt scoring BNLs on Mode A of Dynavision both with moving head and with keeping head focused on center. Utilized foam pad as balance component of task. Will continue to progress and assess in future sessions.   Target Date 10/13/24   OT Goal 3   Goal Identifier Manage eye strain strategies and post-concussion symptoms   Goal Description Patient will identify and utilize 3 adaptive strategies and/or AE to decrease eye strain and post-concussion symptoms and report a score of 15 or less on 2 consecutive visits on the concussion symptom assessment score for increased independence during daily tasks.   Rationale In order to maximize safety and independence with performance of self-care activities   Goal Progress Goal progressing. Pt educated in compensatory strategies to promote symptom management and engagement in daily activities, including symptom tracking/symptom scale, visual accommodations (light sensitivity, visual clutter, electronic displays), sound accommodations, and recommendations for managing symptoms within context of daily activities, especially vision-based ADLs/IADLs. Provided recommendations for glasses for reading-based tasks and light sensitivity. Also trained in vagus nerve stimulation techniques, including SSP Core program, for ANS regulation and overall participation in daily activities. Will continue to monitor needs and further progress as appropriate in future sessions.   Target Date 10/13/24   Subjective Report   Subjective Report Patient reports being on the computer screen is the hardest - is on the computer more  "this time of year. Then has to drive home from work and takes daughter to/froom school 45 minutes away.  Working all in the office now - not working from home.  Driving more to different locations for work through mid-November. Saw Dr. Tran - will be getting new Rx SVL soon - should help with driving, etc.  Also saw Dr. Guzman and work MD.   Objective Measures   Objective Measures Objective Measure 1;Objective Measure 2;Objective Measure 3;Objective Measure 4;Objective Measure 5;Objective Measure 6;Objective Measure 7;Objective Measure 8;Objective Measure 9;Objective Measure 10   Objective Measure 1   Objective Measure Concussion Symptom Assessment Score (CSA)   Details 11/1/24: # of sx's: 12, severity: 18 (same as 9/24/24 but patient was at work for the morning today vs. not at work prior to to OT on 9/24/24) - see flowsheet   Objective Measure 2   Objective Measure Saccades   Details 11/1/24: WNLs - much improved; 9/24/24: Saccades: no head movement, able to do 5 rounds, mild difficulty last few rounds over/undershooting mildly, mild blinking - improved   Objective Measure 3   Objective Measure Accommodation   Details 5/14/24: modified Banks chart: standing about 4 feet from chart and looking at small chart ~14\" from her: completed 40 rounds (80 letters) in 1'38\" with good tolerance: saw lines/letters wiggling when came back to near chart but no other symptoms   Objective Measure 4   Objective Measure [facility] Sensory Scales (BBCSS):   Details As of 9/12/24 - Using the scale 1 = low impairment and 4 = high impairment, the pt scored the following on the BBCSS: auditory hypersensitivity = 1.8 (was 2.7), auditory hyposensitivity to voices = 1.2 (was 1.8), visual hypersensitivity = 1.9 (was 2.9), tactile hypersensitivity = 1.3 (was 1.3), affiliative touch aversion = 1.0 (was 1.3), selective eating = 1.3 (was 1.2), ingestive problems = 1.0 (was 1.0), and digestive problems = 1.3 (was 1.3). Previous scores " "from 7/3/24.   Objective Measure 5   Objective Measure Convergence Insufficiency Symptom Survey (CISS)   Details 11/1/24: 30 - slightly worse than previously but has been on computer already 1 1/2 hours today; 9/24/24: patient scored 28 (for adults, score of 21 or higher is suggestive of convergence insufficieny) - improved (on 4/18/24: was 33 and on 7/9: was 30)   Objective Measure 6   Objective Measure near acuity via MNRead   Details 7/9/24: 20/25 at 16\" no correction (had LASIK) and 20/20 at ~13\" with warm task light but \"blurry\"   Objective Measure 7   Objective Measure Convergence   Details 11/1/24: strain at 11, 12\" on 2 trials and double at 6\" and 7.5\" - slighlty improved; 9/24/24: c/o double at 7\", 8\" and 8\" on 3 trials (improved from 12\" at eval, slight decline from 7/9/24: c/o double and dysconjugate vision noted at  6\" on all 3 trials); reports was on computer more this past weekend - possibly contributing to slight decline in convergence - didn't get break over the weekend.   Objective Measure 8   Objective Measure Dynavision   Details 10/11/24: Mode A with head turns: 75 hits (average reaction time 0.9 sec top L, 0.9 sec top R, 0.8 sec bottom R, 0.7 sec bottom L; WNLs is 52+ hits; reports increased dizziness and \"halo\" around lights). Following about 2 min recovery break - Mode A, central focus with head stationary: 56 hits (average reaction time 1.2 sec bottom R, 1.0 sec all other quadrants; improved symptoms with only min change in dizziness).   Objective Measure 9   Objective Measure Pursuits:   Details 11/1/24: WNL - much improved; 9/24/24: mild deficits/abnormal and able to do 2 rotations each direction and towards last 2 rotations patient had mild difficulty keeping on target, no head movement - much improved from eval   Objective Measure 10   Objective Measure BiVABA Visual Scan Course   Details 10/11/24: On 60 foot scan course with targets high/medium/low, patient gets 9/10 left and 9/10 right " "in 54.1 sec 1st trial and 10/10 L, 10/10 R in 40.3 sec for second trial following education on missed targets (lower). On first trial, pt with two min losses of balance with ability to self-correct. Reports feeling dizzy on both trials with second, faster trial being more challenging than the first.  WNL is consistent scores of >19/20, completing course in 35-50 seconds.   Treatment Interventions (OT)   Interventions Self Care/Home Management;Therapeutic Activity   Self Care/Home Management   Self-Care/Home Mgmt/ADL, Compensatory, Meal Prep Minutes (01048) 28 Minutes   Self Care 1 - Details Numerous screens above (CSA, CISS, oculomotor, etc.) - patient educated in results and application to daily skills.  Educated at length in continued strategies to manage post-concussion symptoms (spread out visits to sites meetings and time on computer - be proactive abnd continue to try to work from home 1 day/week as able, etc.). Re-education and re-trial of filters with Rx glasses that she'll be getting: Reina Cocoons Large optimal - issued resouces.   Skilled Intervention Skilled education in symptom management strategies to promote IND with ADLs/IADLs   Patient Response/Progress progress in goals 1, 3   Therapeutic Activity   Therapeutic Activity Minutes (82581) 15   Ther Act 1 - Details Patient participated in therapeutic activities via visual scanning and visual skills for increased ability to complete vision-based ADL/IADLs at varying levels of distance (near, intermediate). Facilitated skilled teach back of home program with pt completing  3-4 reps of: 1) binocular pursuits clockwise then counterclockwise, 2) adduction monocularly with stick, and 3) convergence with stick. Completed with SBA and min verbal cues this date. To further progress visual skills for peripersonal visual tasks, re-educated in use of Eh String: started wiht 1st bead at 11\" and able to go to 10\" with good tolerance, ~12 reps between beads total " "  Skilled Intervention visual scanning activities and visual skills for increased ability to complete vision-based ADLs   Patient Response/Progress Goals 1-3 progressing   Education   Learner/Method Patient;Listening;Reading;Demonstration   Education Comments see tx details above   Plan   Home program am: pursuits binocular, monocular adduction with accommodation, convergence task simple (stick) as prepatory skill for reading/computer, marbles; pm: pursuits, Eh String (start at 11\" 1st bead and move in as able)   Updates to plan of care get Reina Cocoons for over her SVL   Plan for next session continue per POC   Total Session Time   Timed Code Treatment Minutes 43   Total Treatment Time (sum of timed and untimed services) 43     PLAN  Continue therapy per current plan of care.    Beginning/End Dates of Progress Note Reporting Period:  07/15/24 to 11/01/2024    Referring Provider:  Traci Guzman   "

## 2024-12-05 ENCOUNTER — THERAPY VISIT (OUTPATIENT)
Dept: OCCUPATIONAL THERAPY | Facility: CLINIC | Age: 39
End: 2024-12-05
Attending: PHYSICAL MEDICINE & REHABILITATION
Payer: OTHER MISCELLANEOUS

## 2024-12-05 DIAGNOSIS — S06.0X0A CONCUSSION WITHOUT LOSS OF CONSCIOUSNESS, INITIAL ENCOUNTER: Primary | ICD-10-CM

## 2024-12-05 PROCEDURE — 97530 THERAPEUTIC ACTIVITIES: CPT | Mod: GO

## 2024-12-06 ENCOUNTER — TELEPHONE (OUTPATIENT)
Dept: PHYSICAL MEDICINE AND REHAB | Facility: CLINIC | Age: 39
End: 2024-12-06
Payer: COMMERCIAL

## 2024-12-06 NOTE — TELEPHONE ENCOUNTER
Due to Transfer of Care- [facility] staff has reached out 3x and left detailed voice messages.  Please call [phone] to reschedule.

## 2024-12-11 ENCOUNTER — APPOINTMENT (OUTPATIENT)
Dept: OPTOMETRY | Facility: CLINIC | Age: 39
End: 2024-12-11
Payer: OTHER MISCELLANEOUS

## 2024-12-11 PROCEDURE — V2025 EYEGLASSES DELUX FRAMES: HCPCS | Performed by: OPTOMETRIST

## 2024-12-11 PROCEDURE — V2750 ANTI-REFLECTIVE COATING: HCPCS | Performed by: OPTOMETRIST

## 2024-12-11 PROCEDURE — V2100 LENS SPHER SINGLE PLANO 4.00: HCPCS | Mod: RT | Performed by: OPTOMETRIST

## 2024-12-11 PROCEDURE — V2744 TINT PHOTOCHROMATIC LENS/ES: HCPCS | Performed by: OPTOMETRIST

## 2025-01-20 ENCOUNTER — THERAPY VISIT (OUTPATIENT)
Dept: OCCUPATIONAL THERAPY | Facility: CLINIC | Age: 40
End: 2025-01-20
Attending: PHYSICIAN ASSISTANT
Payer: OTHER MISCELLANEOUS

## 2025-01-20 DIAGNOSIS — S06.0X0A CONCUSSION WITHOUT LOSS OF CONSCIOUSNESS, INITIAL ENCOUNTER: Primary | ICD-10-CM

## 2025-01-20 PROCEDURE — 97530 THERAPEUTIC ACTIVITIES: CPT | Mod: GO

## 2025-02-17 ENCOUNTER — THERAPY VISIT (OUTPATIENT)
Dept: OCCUPATIONAL THERAPY | Facility: CLINIC | Age: 40
End: 2025-02-17
Attending: PHYSICIAN ASSISTANT
Payer: OTHER MISCELLANEOUS

## 2025-02-17 DIAGNOSIS — S06.0X0A CONCUSSION WITHOUT LOSS OF CONSCIOUSNESS, INITIAL ENCOUNTER: Primary | ICD-10-CM

## 2025-02-17 PROCEDURE — 97530 THERAPEUTIC ACTIVITIES: CPT | Mod: GO

## 2025-03-07 ENCOUNTER — APPOINTMENT (OUTPATIENT)
Dept: MRI IMAGING | Facility: CLINIC | Age: 40
End: 2025-03-07
Attending: EMERGENCY MEDICINE
Payer: COMMERCIAL

## 2025-03-07 ENCOUNTER — HOSPITAL ENCOUNTER (EMERGENCY)
Facility: CLINIC | Age: 40
Discharge: HOME | End: 2025-03-07
Attending: EMERGENCY MEDICINE | Admitting: EMERGENCY MEDICINE
Payer: COMMERCIAL

## 2025-03-07 VITALS
DIASTOLIC BLOOD PRESSURE: 79 MMHG | HEART RATE: 89 BPM | TEMPERATURE: 98.2 F | OXYGEN SATURATION: 99 % | BODY MASS INDEX: 20.46 KG/M2 | RESPIRATION RATE: 18 BRPM | SYSTOLIC BLOOD PRESSURE: 111 MMHG | WEIGHT: 115.52 LBS

## 2025-03-07 DIAGNOSIS — R51.9 NONINTRACTABLE HEADACHE, UNSPECIFIED CHRONICITY PATTERN, UNSPECIFIED HEADACHE TYPE: ICD-10-CM

## 2025-03-07 LAB
ANION GAP SERPL CALCULATED.3IONS-SCNC: 8 MMOL/L (ref 7–15)
BASOPHILS # BLD AUTO: 0 10E3/UL (ref 0–0.2)
BASOPHILS NFR BLD AUTO: 0 %
BUN SERPL-MCNC: 13.7 MG/DL (ref 6–20)
CALCIUM SERPL-MCNC: 9.6 MG/DL (ref 8.8–10.4)
CHLORIDE SERPL-SCNC: 103 MMOL/L (ref 98–107)
CREAT SERPL-MCNC: 0.81 MG/DL (ref 0.51–0.95)
EGFRCR SERPLBLD CKD-EPI 2021: >90 ML/MIN/1.73M2
EOSINOPHIL # BLD AUTO: 0 10E3/UL (ref 0–0.7)
EOSINOPHIL NFR BLD AUTO: 0 %
ERYTHROCYTE [DISTWIDTH] IN BLOOD BY AUTOMATED COUNT: 12.3 % (ref 10–15)
GLUCOSE SERPL-MCNC: 98 MG/DL (ref 70–99)
HCG SERPL QL: NEGATIVE
HCO3 SERPL-SCNC: 28 MMOL/L (ref 22–29)
HCT VFR BLD AUTO: 40.6 % (ref 35–47)
HGB BLD-MCNC: 13.3 G/DL (ref 11.7–15.7)
HOLD SPECIMEN: NORMAL
IMM GRANULOCYTES # BLD: 0 10E3/UL
IMM GRANULOCYTES NFR BLD: 0 %
LYMPHOCYTES # BLD AUTO: 2.9 10E3/UL (ref 0.8–5.3)
LYMPHOCYTES NFR BLD AUTO: 34 %
MCH RBC QN AUTO: 30.3 PG (ref 26.5–33)
MCHC RBC AUTO-ENTMCNC: 32.8 G/DL (ref 31.5–36.5)
MCV RBC AUTO: 93 FL (ref 78–100)
MONOCYTES # BLD AUTO: 0.5 10E3/UL (ref 0–1.3)
MONOCYTES NFR BLD AUTO: 6 %
NEUTROPHILS # BLD AUTO: 5.2 10E3/UL (ref 1.6–8.3)
NEUTROPHILS NFR BLD AUTO: 60 %
NRBC # BLD AUTO: 0 10E3/UL
NRBC BLD AUTO-RTO: 0 /100
PLATELET # BLD AUTO: 289 10E3/UL (ref 150–450)
POTASSIUM SERPL-SCNC: 3.8 MMOL/L (ref 3.4–5.3)
RBC # BLD AUTO: 4.39 10E6/UL (ref 3.8–5.2)
SODIUM SERPL-SCNC: 139 MMOL/L (ref 135–145)
WBC # BLD AUTO: 8.6 10E3/UL (ref 4–11)

## 2025-03-07 PROCEDURE — 80048 BASIC METABOLIC PNL TOTAL CA: CPT | Performed by: EMERGENCY MEDICINE

## 2025-03-07 PROCEDURE — A9585 GADOBUTROL INJECTION: HCPCS | Performed by: EMERGENCY MEDICINE

## 2025-03-07 PROCEDURE — 70549 MR ANGIOGRAPH NECK W/O&W/DYE: CPT

## 2025-03-07 PROCEDURE — 85004 AUTOMATED DIFF WBC COUNT: CPT | Performed by: EMERGENCY MEDICINE

## 2025-03-07 PROCEDURE — 96374 THER/PROPH/DIAG INJ IV PUSH: CPT

## 2025-03-07 PROCEDURE — 70553 MRI BRAIN STEM W/O & W/DYE: CPT

## 2025-03-07 PROCEDURE — 99285 EMERGENCY DEPT VISIT HI MDM: CPT | Mod: 25

## 2025-03-07 PROCEDURE — 36415 COLL VENOUS BLD VENIPUNCTURE: CPT | Performed by: EMERGENCY MEDICINE

## 2025-03-07 PROCEDURE — 250N000011 HC RX IP 250 OP 636: Performed by: EMERGENCY MEDICINE

## 2025-03-07 PROCEDURE — 70544 MR ANGIOGRAPHY HEAD W/O DYE: CPT

## 2025-03-07 PROCEDURE — 84703 CHORIONIC GONADOTROPIN ASSAY: CPT | Performed by: EMERGENCY MEDICINE

## 2025-03-07 PROCEDURE — 96375 TX/PRO/DX INJ NEW DRUG ADDON: CPT

## 2025-03-07 PROCEDURE — 255N000002 HC RX 255 OP 636: Performed by: EMERGENCY MEDICINE

## 2025-03-07 RX ORDER — GADOBUTROL 604.72 MG/ML
10 INJECTION INTRAVENOUS ONCE
Status: COMPLETED | OUTPATIENT
Start: 2025-03-07 | End: 2025-03-07

## 2025-03-07 RX ORDER — METOCLOPRAMIDE HYDROCHLORIDE 5 MG/ML
10 INJECTION INTRAMUSCULAR; INTRAVENOUS ONCE
Status: COMPLETED | OUTPATIENT
Start: 2025-03-07 | End: 2025-03-07

## 2025-03-07 RX ORDER — DEXAMETHASONE SODIUM PHOSPHATE 10 MG/ML
10 INJECTION, SOLUTION INTRAMUSCULAR; INTRAVENOUS ONCE
Status: COMPLETED | OUTPATIENT
Start: 2025-03-07 | End: 2025-03-07

## 2025-03-07 RX ORDER — DIPHENHYDRAMINE HYDROCHLORIDE 50 MG/ML
25 INJECTION, SOLUTION INTRAMUSCULAR; INTRAVENOUS ONCE
Status: COMPLETED | OUTPATIENT
Start: 2025-03-07 | End: 2025-03-07

## 2025-03-07 RX ADMIN — METOCLOPRAMIDE 10 MG: 5 INJECTION, SOLUTION INTRAMUSCULAR; INTRAVENOUS at 19:16

## 2025-03-07 RX ADMIN — DIPHENHYDRAMINE HYDROCHLORIDE 25 MG: 50 INJECTION, SOLUTION INTRAMUSCULAR; INTRAVENOUS at 19:15

## 2025-03-07 RX ADMIN — GADOBUTROL 10 ML: 604.72 INJECTION INTRAVENOUS at 19:41

## 2025-03-07 RX ADMIN — DEXAMETHASONE SODIUM PHOSPHATE 10 MG: 10 INJECTION, SOLUTION INTRAMUSCULAR; INTRAVENOUS at 19:13

## 2025-03-07 ASSESSMENT — ACTIVITIES OF DAILY LIVING (ADL)
ADLS_ACUITY_SCORE: 41

## 2025-03-07 ASSESSMENT — COLUMBIA-SUICIDE SEVERITY RATING SCALE - C-SSRS
6. HAVE YOU EVER DONE ANYTHING, STARTED TO DO ANYTHING, OR PREPARED TO DO ANYTHING TO END YOUR LIFE?: NO
1. IN THE PAST MONTH, HAVE YOU WISHED YOU WERE DEAD OR WISHED YOU COULD GO TO SLEEP AND NOT WAKE UP?: NO
2. HAVE YOU ACTUALLY HAD ANY THOUGHTS OF KILLING YOURSELF IN THE PAST MONTH?: NO

## 2025-03-07 NOTE — ED TRIAGE NOTES
Pt with 4 days of sharp headache which has progressed to pounding. Vision is blurry for last 3 days. HA is worse when laying down. Hx of migraines but states this feels different from usual migraines. ABCs intact A&Ox4

## 2025-03-08 NOTE — DISCHARGE INSTRUCTIONS
It was a pleasure taking care of you today. I hope you feel much better soon.  Your evaluation was reassuring against serious brain issue.  Please follow-up with your primary care doctor in 3-5 days.  Return immediately for worse pain, stroke symptoms, or any other concerns.

## 2025-03-08 NOTE — ED PROVIDER NOTES
History     Chief Complaint:  Headache       HPI   Francisco Parham is a 39 year old female who presents with headache and vision changes.  The patient notes that she had it history of possible migraine 8 years ago but typically does not have headaches.  In the last several days, she has had a diffuse, constant headache.  It was gradual in onset but has become more severe since then.  She notes bilateral blurry vision at times.  She denies thunderclap onset, neck pain, fever, speech changes, or any other concerns.        Independent Historian:   None    Review of External Notes:   Reviewed 1/16/2025 PCP visit for comprehensive review of past medical history    ROS:  Review of Systems    Allergies:  Sumatriptan     Medications:    amLODIPine (NORVASC) 10 MG tablet  diazepam (VALIUM) 5 MG tablet  escitalopram (LEXAPRO) 10 MG tablet  hydrOXYzine HCl (ATARAX) 10 MG tablet  propranolol (INDERAL) 10 MG tablet        Past History:    Past Medical History:   Diagnosis Date    Anxiety     HTN (hypertension)          Physical Exam     Patient Vitals for the past 24 hrs:  Vitals:    03/07/25 1750 03/07/25 2130   BP: (!) 134/98 111/79   Pulse: 88 89   Resp: 18 18   Temp: 98.2  F (36.8  C)    SpO2: 98% 99%   Weight: 52.4 kg (115 lb 8.3 oz)          Physical Exam  Constitutional: Alert, attentive  HENT:    Nose: Nose normal.    Mouth/Throat: Oropharynx is clear, mucous membranes are moist  Eyes: EOM are normal. Pupils are equal, round, and reactive to light.   CV: Regular rate and rhythm, no murmurs, rubs or gallops.  Chest: Effort normal and breath sounds normal.   GI: No distension. There is no tenderness  MSK: Normal range of motion.   Neurological:   A/Ox3; Cranial nerves 2-12 intact;   5/5 strength throughout the upper and lower extremities;   sensation intact to light touch throughout the upper and lower extremities;   normal fine motor coordination intact bilaterally;   normal gait   No meningismus   Skin: Skin is warm  and dry.      Emergency Department Course       Results for orders placed or performed during the hospital encounter of 03/07/25   MR Brain w/o & w Contrast     Status: None    Narrative    EXAM: MR BRAIN W/O and W CONTRAST, MRA BRAIN ([place] OF [place]AMM) W/O CONTRAST, MRA NECK (CAROTIDS) W/O and W CONTRAST  LOCATION: [facility]  DATE: 3/7/2025    INDICATION: Headache, vision changes  COMPARISON: None.  CONTRAST: 10 ml Gadavist  TECHNIQUE:   1) Routine multiplanar multisequence head MRI without and with intravenous contrast.  2) 3D time-of-flight head MRA without intravenous contrast.  3) Neck MRA without and with IV contrast. Stenosis measurements made according to NASCET criteria unless otherwise specified.    FINDINGS:  HEAD MRI:  INTRACRANIAL CONTENTS: No acute or subacute infarct. No mass, acute hemorrhage, or extra-axial fluid collections. Normal brain parenchymal signal. Normal ventricles and sulci. Normal position of the cerebellar tonsils. No pathologic contrast enhancement.    SELLA: No abnormality accounting for technique.    OSSEOUS STRUCTURES/SOFT TISSUES: Normal marrow signal. The major intracranial vascular flow voids are maintained.     ORBITS: No abnormality accounting for technique.     SINUSES/MASTOIDS: No paranasal sinus mucosal disease. No middle ear or mastoid effusion.     HEAD MRA:   ANTERIOR CIRCULATION: No stenosis/occlusion, aneurysm, or high flow vascular malformation. Standard [place] anatomy.    POSTERIOR CIRCULATION: No stenosis/occlusion, aneurysm, or high flow vascular malformation. Balanced vertebral arteries supply a normal basilar artery.     NECK MRA:   RIGHT CAROTID: No measurable stenosis or dissection.    LEFT CAROTID: No measurable stenosis or dissection.    VERTEBRAL ARTERIES: No focal stenosis or dissection. Balanced vertebral arteries.    AORTIC ARCH: Bovine origin left common carotid artery. No significant stenosis at the origin of the great  vessels.      Impression    IMPRESSION:  HEAD MRI:   1.  Normal head MRI.    HEAD MRA:   1.  Normal MRA [place] of [place].    NECK MRA:  1.  Normal neck MRA.   MRA Brain ([place] of [place]) wo Contrast     Status: None    Narrative    EXAM: MR BRAIN W/O and W CONTRAST, MRA BRAIN ([place] OF [place]AMM) W/O CONTRAST, MRA NECK (CAROTIDS) W/O and W CONTRAST  LOCATION: [facility]  DATE: 3/7/2025    INDICATION: Headache, vision changes  COMPARISON: None.  CONTRAST: 10 ml Gadavist  TECHNIQUE:   1) Routine multiplanar multisequence head MRI without and with intravenous contrast.  2) 3D time-of-flight head MRA without intravenous contrast.  3) Neck MRA without and with IV contrast. Stenosis measurements made according to NASCET criteria unless otherwise specified.    FINDINGS:  HEAD MRI:  INTRACRANIAL CONTENTS: No acute or subacute infarct. No mass, acute hemorrhage, or extra-axial fluid collections. Normal brain parenchymal signal. Normal ventricles and sulci. Normal position of the cerebellar tonsils. No pathologic contrast enhancement.    SELLA: No abnormality accounting for technique.    OSSEOUS STRUCTURES/SOFT TISSUES: Normal marrow signal. The major intracranial vascular flow voids are maintained.     ORBITS: No abnormality accounting for technique.     SINUSES/MASTOIDS: No paranasal sinus mucosal disease. No middle ear or mastoid effusion.     HEAD MRA:   ANTERIOR CIRCULATION: No stenosis/occlusion, aneurysm, or high flow vascular malformation. Standard [place] anatomy.    POSTERIOR CIRCULATION: No stenosis/occlusion, aneurysm, or high flow vascular malformation. Balanced vertebral arteries supply a normal basilar artery.     NECK MRA:   RIGHT CAROTID: No measurable stenosis or dissection.    LEFT CAROTID: No measurable stenosis or dissection.    VERTEBRAL ARTERIES: No focal stenosis or dissection. Balanced vertebral arteries.    AORTIC ARCH: Bovine origin left common carotid artery. No  significant stenosis at the origin of the great vessels.      Impression    IMPRESSION:  HEAD MRI:   1.  Normal head MRI.    HEAD MRA:   1.  Normal MRA [place].    NECK MRA:  1.  Normal neck MRA.   MRA Neck (Carotids) wo & w Contrast     Status: None    Narrative    EXAM: MR BRAIN W/O and W CONTRAST, MRA BRAIN ([place] OF HAMM) W/O CONTRAST, MRA NECK (CAROTIDS) W/O and W CONTRAST  LOCATION: [facility]  DATE: 3/7/2025    INDICATION: Headache, vision changes  COMPARISON: None.  CONTRAST: 10 ml Gadavist  TECHNIQUE:   1) Routine multiplanar multisequence head MRI without and with intravenous contrast.  2) 3D time-of-flight head MRA without intravenous contrast.  3) Neck MRA without and with IV contrast. Stenosis measurements made according to NASCET criteria unless otherwise specified.    FINDINGS:  HEAD MRI:  INTRACRANIAL CONTENTS: No acute or subacute infarct. No mass, acute hemorrhage, or extra-axial fluid collections. Normal brain parenchymal signal. Normal ventricles and sulci. Normal position of the cerebellar tonsils. No pathologic contrast enhancement.    SELLA: No abnormality accounting for technique.    OSSEOUS STRUCTURES/SOFT TISSUES: Normal marrow signal. The major intracranial vascular flow voids are maintained.     ORBITS: No abnormality accounting for technique.     SINUSES/MASTOIDS: No paranasal sinus mucosal disease. No middle ear or mastoid effusion.     HEAD MRA:   ANTERIOR CIRCULATION: No stenosis/occlusion, aneurysm, or high flow vascular malformation. Standard [place] anatomy.    POSTERIOR CIRCULATION: No stenosis/occlusion, aneurysm, or high flow vascular malformation. Balanced vertebral arteries supply a normal basilar artery.     NECK MRA:   RIGHT CAROTID: No measurable stenosis or dissection.    LEFT CAROTID: No measurable stenosis or dissection.    VERTEBRAL ARTERIES: No focal stenosis or dissection. Balanced vertebral arteries.    AORTIC ARCH: Bovine  origin left common carotid artery. No significant stenosis at the origin of the great vessels.      Impression    IMPRESSION:  HEAD MRI:   1.  Normal head MRI.    HEAD MRA:   1.  Normal MRA [place].    NECK MRA:  1.  Normal neck MRA.   Basic metabolic panel (BMP)     Status: Normal   Result Value Ref Range    Sodium 139 135 - 145 mmol/L    Potassium 3.8 3.4 - 5.3 mmol/L    Chloride 103 98 - 107 mmol/L    Carbon Dioxide (CO2) 28 22 - 29 mmol/L    Anion Gap 8 7 - 15 mmol/L    Urea Nitrogen 13.7 6.0 - 20.0 mg/dL    Creatinine 0.81 0.51 - 0.95 mg/dL    GFR Estimate >90 >60 mL/min/1.73m2    Calcium 9.6 8.8 - 10.4 mg/dL    Glucose 98 70 - 99 mg/dL   HCG QUALitative pregnancy (blood)     Status: Normal   Result Value Ref Range    hCG Serum Qualitative Negative Negative   [place] Draw     Status: None    Narrative    The following orders were created for panel order [place] Draw.  Procedure                               Abnormality         Status                     ---------                               -----------         ------                     Extra Blue Top Tube[[id number]]                             Final result                 Please view results for these tests on the individual orders.   Extra Blue Top Tube     Status: None   Result Value Ref Range    Hold Specimen JI    CBC with platelets and differential     Status: None   Result Value Ref Range    WBC Count 8.6 4.0 - 11.0 10e3/uL    RBC Count 4.39 3.80 - 5.20 10e6/uL    Hemoglobin 13.3 11.7 - 15.7 g/dL    Hematocrit 40.6 35.0 - 47.0 %    MCV 93 78 - 100 fL    MCH 30.3 26.5 - 33.0 pg    MCHC 32.8 31.5 - 36.5 g/dL    RDW 12.3 10.0 - 15.0 %    Platelet Count 289 150 - 450 10e3/uL    % Neutrophils 60 %    % Lymphocytes 34 %    % Monocytes 6 %    % Eosinophils 0 %    % Basophils 0 %    % Immature Granulocytes 0 %    NRBCs per 100 WBC 0 <1 /100    Absolute Neutrophils 5.2 1.6 - 8.3 10e3/uL    Absolute Lymphocytes 2.9 0.8 - 5.3 10e3/uL    Absolute  Monocytes 0.5 0.0 - 1.3 10e3/uL    Absolute Eosinophils 0.0 0.0 - 0.7 10e3/uL    Absolute Basophils 0.0 0.0 - 0.2 10e3/uL    Absolute Immature Granulocytes 0.0 <=0.4 10e3/uL    Absolute NRBCs 0.0 10e3/uL   CBC + differential     Status: None    Narrative    The following orders were created for panel order CBC + differential.  Procedure                               Abnormality         Status                     ---------                               -----------         ------                     CBC with platelets and ...[[id number]]                      Final result                 Please view results for these tests on the individual orders.       Emergency Department Course & Assessments:             Interventions:  Medications   metoclopramide (REGLAN) injection 10 mg (10 mg Intravenous $Given 3/7/25 1916)   diphenhydrAMINE (BENADRYL) injection 25 mg (25 mg Intravenous $Given 3/7/25 1915)   dexAMETHasone PF (DECADRON) injection 10 mg (10 mg Intravenous $Given 3/7/25 1913)   gadobutrol (GADAVIST) injection 10 mL (10 mLs Intravenous $Given 3/7/25 1941)   sodium chloride (PF) 0.9% PF flush 60 mL (100 mLs Intravenous $Given 3/7/25 1941)              Disposition:  The patient was discharged to home.     Impression & Plan      Medical Decision Making:  This a pleasant 39-year-old female presents for evaluation of severe headache.  There is no thunderclap or worst at onset component to suggest SAH no recent fever, neck pain or stiffness to suggest meningitis.  She has a normal neurologic exam.  MRI brain and MRA head/neck are unremarkable.  With supportive cares, the patient is significant improved and vision is normalized.  I explained the unclear nature of symptoms, possibly migraine.  Plan neurology follow-up if symptoms become more recurrent.  Return precautions for fever, worse pain, stroke symptoms, or any other concerns.            Diagnosis:  Visit Diagnosis, Associated Orders, and Comments     ICD-10-CM     1. Nonintractable headache, unspecified chronicity pattern, unspecified headache type  R51.9              Zach Badillo MD  03/08/25 0047

## 2025-06-08 ENCOUNTER — HEALTH MAINTENANCE LETTER (OUTPATIENT)
Age: 40
End: 2025-06-08